# Patient Record
(demographics unavailable — no encounter records)

---

## 2025-02-20 NOTE — DISCUSSION/SUMMARY
[FreeTextEntry1] : - GC/CT done today -f/u prenatal blood work drawn today -Rx sent for PNV and Doxylamine-Pyridoxine - pt maybe considering termination because her partner does not want the pregnancy as of now - pt is leaving for [place] in 2 weeks and will be gone for a month

## 2025-02-20 NOTE — HISTORY OF PRESENT ILLNESS
[Currently Active] : currently active [Men] : men [Vaginal] : vaginal [No] : No [Regular Cycle Intervals] : periods have been regular [PapSmeardate] : 2 YEARS [FreeTextEntry1] : 12/24/2024

## 2025-07-25 NOTE — REVIEW OF SYSTEMS
[Fever] : no fever [Chills] : no chills [Night Sweats] : no night sweats [Fatigue] : fatigue [Recent Change In Weight] : ~T no recent weight change [Abdominal Pain] : no abdominal pain [Vomiting] : no vomiting [Constipation] : no constipation [Diarrhea: Grade 0] : Diarrhea: Grade 0 [Negative] : Allergic/Immunologic

## 2025-07-25 NOTE — REASON FOR VISIT
[Home] : at home, [unfilled] , at the time of the visit. [Other Location: e.g. Home (Enter Location, City,State)___] : at [unfilled] [Telehealth (audio & video)] : This visit was provided via telehealth using real-time 2-way audio visual technology. [Verbal consent obtained from patient] : the patient, [unfilled] [Initial Consultation] : an initial consultation for [FreeTextEntry2] : anemia in pregnancy

## 2025-07-25 NOTE — ASSESSMENT
[FreeTextEntry1] : 39-year-old female with a past medical history of chronic Hepatitis B, history of childhood anemia and transfusions, anorexia, and lifelong nutritional deficiencies, currently 30 weeks pregnant with an MACK of 9/30, presents for evaluation of anemia in pregnancy. She reports persistent fatigue, weakness, and intermittent dizziness, but denies cardiorespiratory symptoms, constipation, rash, or leg cramping. She has a history of blood transfusions in childhood ([place]) and in 2009 (2 units PRBCs). She states she saw a hematologist as a child and was told her anemia stemmed from poor nutrition and low socioeconomic status. She has not received IV iron previously and cannot tolerate oral iron due to gastrointestinal distress. She admits to feeling frustrated by repeated questioning. Labs reveal HGB 11.8 g/dL, HCT 36.6%, MCV 92.9 fL, MCH 29.9 pg, RDW 17.4% (elevated), ferritin 12 ng/mL, and normal WBC and platelet counts. Differential shows mild neutrophilia and preserved lymphocyte and monocyte counts.   Problem #1: Anemia in Pregnancy - Likely Iron Deficiency Patient unable to tolerate daily oral iron due to gastrointestinal side effects. Workup initiated to further evaluate etiology and confirm iron deficiency:  -Nutritional deficiency : Vitamin B12 and Folate -Iron panel: Ferritin, serum iron, TIBC, transferrin saturation -Hemoglobin electrophoresis normal pattern  -Patient advised to present to a local [facility] ([facility]) for lab collection.Once labs are resulted, provider will review and contact patient with results and next steps. - pt wants us to communicate via email  -All questions were addressed but patient was not satisfactory she was not happy that i didn't offer more locations for IV iron ASA tasked with emailing lab orders and [facility] locations to patient.

## 2025-07-25 NOTE — HISTORY OF PRESENT ILLNESS
[de-identified] : cc: anemia in pregnancy referring MD: Karen Read  39-year-old female with a past medical history of chronic Hepatitis B, history of childhood anemia and transfusions, anorexia, and lifelong nutritional deficiencies, currently 30 weeks pregnant with an MACK of , presents for evaluation of anemia in pregnancy. She reports persistent fatigue, weakness, and intermittent dizziness, but denies cardiorespiratory symptoms, constipation, rash, or leg cramping. She has a history of blood transfusions in childhood ([place]) and in  (2 units PRBCs). She states she saw a hematologist as a child and was told her anemia stemmed from poor nutrition and low socioeconomic status. She has not received IV iron previously and cannot tolerate oral iron due to gastrointestinal distress. She admits to feeling frustrated by repeated questioning. Labs reveal HGB 11.8 g/dL, HCT 36.6%, MCV 92.9 fL, MCH 29.9 pg, RDW 17.4% (elevated), ferritin 12 ng/mL, and normal WBC and platelet counts. Differential shows mild neutrophilia and preserved lymphocyte and monocyte counts.    Denies excessive or spontaneous bleeding or bruising. Denies  enlarged nodes. Denies  dysuria,  nocturia,  hesitancy,  urgency, oliguria,  hematuria, incontinence. Denies , abdominal pain,  jaundice, melena, blood in stool. Denies  palpitations,  dyspnea, orthopnea, PND, Denies claudication,  edema,  varicose veins, Denies Fever, rigors,  diaphoresis.  Denies anorexia,  weight loss, sleep disturbance.  Denies resting dyspnea, exertional dyspnea, wheezing, cough,  stridor,  hemoptysis, Denies rash,  pruritus,  skin lesions, Denies bone pain,  Myalgia,  arthralgia,   joint swelling,  limited range of motion, Patient does not complain of frequent or severe infections. Patient does not complain of any allergic reactions.   PMHX: seasonal allergies  Psx: L4-L5 epidural injection - laser surgery , tonsillectomy, dental removal.  NKDA Meds: see list : reconciled prenatals ,  magnesium,  iron  OB gyn hx :  total pregnancies : 1 , menses every month lasting 5-6 days -  heavy to normal  Fam hx : M- alive and well  F -  -  unknown  born in: [place]    Social hx : partnered,  self employed ,  no icall hx    HCM: does not have PCP  mammo/sono :  none  colonoscopy/EGD : Colonoscopy - all ok  . EGD. all ok.  pap smear:  COVID vax:  yes  COVID infection: yes